# Patient Record
Sex: FEMALE | Race: WHITE
[De-identification: names, ages, dates, MRNs, and addresses within clinical notes are randomized per-mention and may not be internally consistent; named-entity substitution may affect disease eponyms.]

---

## 2019-08-19 ENCOUNTER — HOSPITAL ENCOUNTER (EMERGENCY)
Dept: HOSPITAL 25 - UCCORT | Age: 59
Discharge: HOME | End: 2019-08-19
Payer: COMMERCIAL

## 2019-08-19 VITALS — DIASTOLIC BLOOD PRESSURE: 68 MMHG | SYSTOLIC BLOOD PRESSURE: 134 MMHG

## 2019-08-19 DIAGNOSIS — F17.200: ICD-10-CM

## 2019-08-19 DIAGNOSIS — J44.1: Primary | ICD-10-CM

## 2019-08-19 DIAGNOSIS — E03.9: ICD-10-CM

## 2019-08-19 PROCEDURE — G0463 HOSPITAL OUTPT CLINIC VISIT: HCPCS

## 2019-08-19 PROCEDURE — 71046 X-RAY EXAM CHEST 2 VIEWS: CPT

## 2019-08-19 PROCEDURE — 99213 OFFICE O/P EST LOW 20 MIN: CPT

## 2019-08-19 NOTE — UC
General HPI





- HPI Summary


HPI Summary: 


59-year-old woman comes in with a chief complaint of shortness of breath.  She 

has COPD.  She's had upper respiratory tract infection symptoms for at least a 

week.  Been having green sputum.  She has oxygen that she uses when necessary 

at home.  Last 4 days she's been wearing continuously.  Breathing treatments 

help some but overall she's getting worse.  Decreased ability to ambulate 

secondary to shortness of breath.  Her chest feels tight from shortness of 

breath she states but denies any chest pain otherwise.  No pedal edema no calf 

pain.





- History of Current Complaint


Chief Complaint: UCGeneralIllness


Stated Complaint: DIFFICULTY BREATHING


Time Seen by Provider: 19 08:50


Pain Intensity: 0





- Allergy/Home Medications


Allergies/Adverse Reactions: 


 Allergies











Allergy/AdvReac Type Severity Reaction Status Date / Time


 


No Known Allergies Allergy   Verified 19 08:46











Home Medications: 


 Home Medications





Albuterol 2.5MG/3ML (0.083%)* [Ventolin 2.5 MG/3 ML NEB.SOL*] 2.5 mg INH Q4H  [History Confirmed 19]


Albuterol HFA INHALER* [Ventolin HFA Inhaler*] 2 puff INH Q4H PRN 19 [

History Confirmed 19]


FLUoxetine CAP* [PROzac CAP*] 10 mg PO DAILY 19 [History Confirmed ]


Levothyroxine Sodium 125 mcg PO DAILY 19 [History Confirmed 19]


Zolpidem Tartrate [Ambien] 5 mg PO DAILY 19 [History Confirmed 19]











PMH/Surg Hx/FS Hx/Imm Hx


Previously Healthy: Yes


Endocrine History: Hypothyroidism


Respiratory History: COPD





- Surgical History


Surgical History: Yes


Surgery Procedure, Year, and Place: hysterectomy





- Family History


Known Family History: Positive: Non-Contributory





- Social History


Alcohol Use: Occasionally


Substance Use Type: None


Smoking Status (MU): Light Every Day Tobacco Smoker


Length of Time of Smoking/Using Tobacco: 40y





Review of Systems


All Other Systems Reviewed And Are Negative: Yes


Constitutional: Positive: Other - SEE HPI


Skin: Positive: Negative


Eyes: Positive: Negative


ENT: Positive: Nasal Discharge


Respiratory: Positive: Shortness Of Breath, Cough, Other - SEE HPI


Cardiovascular: Positive: Other - SEE HPI


Gastrointestinal: Positive: Negative


Motor: Positive: Negative


Neurovascular: Positive: Negative


Musculoskeletal: Positive: Negative.  Negative: Calf Tenderness, Edema


Neurological: Positive: Negative


Psychological: Positive: Negative


Is Patient Immunocompromised?: No





Physical Exam


Triage Information Reviewed: Yes


Appearance: Well-Appearing, No Pain Distress, Well-Nourished


Vital Signs: 


 Initial Vital Signs











Temp  98.3 F   19 08:40


 


Pulse  78   19 08:40


 


Resp  24   19 08:40


 


BP  134/68   19 08:40


 


Pulse Ox  94   19 08:40











Vital Signs Reviewed: Yes


Eye Exam: Normal


Eyes: Positive: Conjunctiva Clear


ENT: Positive: Nasal congestion


Neck: Positive: Supple


Respiratory: Positive: Respiratory distress - MILD, Wheezing


Cardiovascular: Positive: RRR


Musculoskeletal: Positive: Strength Intact, ROM Intact, No Edema - NO CALF 

TENDERNESS


Neurological: Positive: Alert


Psychological: Positive: Age Appropriate Behavior


Skin Exam: Normal





Course/Dx





- Course


Course Of Treatment: 





Patient Name: SANTOS LUU Medical Record#: [id number]


Ordering Physician: Matthew Delacruz MD Acct.#: [id number]


: 1960 Age: 59 Sex: F Location: URGENT CARE [facility]


Exam Date: 19 ADM Status: REG ER





Order Information: CHEST PA LAT 2 VWS


Accession Number: [id number]


CPT: [id number]


INDICATION: Shortness of breath





COMPARISON: There are no relevant prior studies available for comparison.





TECHNIQUE: Dual-energy PA and lateral views of the chest were obtained.





FINDINGS:





The lungs are clear. There is no pleural effusion.





The cardiomediastinal silhouette is within normal limits.





The upper abdominal contents are normal.





Osseous structures are unremarkable.





IMPRESSION:





1. No acute cardiopulmonary process by radiograph.











____________________________________________________________


<Electronically signed by Dylan Stokes MD in OV> 19 [id number]








I discussed the x-rays with the patient.  I discussed going to the emergency 

department for further evaluation and care of the patient declined going to 

emergency department.  Plan is to treat with Levaquin 750 mg by mouth daily for 

7 days.  Also treated with prednisone.  She will use her nebulizers.  Follow-up 

primary care doctor go the emergency department if not improved worse.





- Diagnoses


Provider Diagnosis: 


 COPD exacerbation, Bronchitis








Discharge





- Sign-Out/Discharge


Documenting (check all that apply): Patient Departure


All imaging exams completed and their final reports reviewed: Yes





- Discharge Plan


Condition: Stable


Disposition: HOME


Prescriptions: 


Levofloxacin TAB* [Levaquin TAB*] 750 mg PO DAILY #7 tab


predniSONE TAB* [Deltasone 20 MG TAB*] 40 mg PO DAILY #10 tab


Patient Education Materials:  COPD (Chronic Obstructive Pulmonary Disease) (ED)

, Acute Bronchitis (ED)


Referrals: 


LOVE Soler [Primary Care Provider] - 


Additional Instructions: 


FOLLOW UP WITH YOUR DOCTOR IF NOT COMPLETELY IMPROVED.


GO TO THE EMERGENCY DEPARTMENT IF NOT IMPROVED OR WORSE; SHORTNESS OF BREATH, 

CHEST PAIN, YOU FEEL ILL OR ANY QUESTIONS OR CONCERNS.








- Billing Disposition and Condition


Condition: STABLE


Disposition: Home

## 2021-04-24 ENCOUNTER — HOSPITAL ENCOUNTER (EMERGENCY)
Dept: HOSPITAL 11 - JP.ED | Age: 61
LOS: 1 days | Discharge: HOME | End: 2021-04-25
Payer: MEDICAID

## 2021-04-24 DIAGNOSIS — J47.1: Primary | ICD-10-CM

## 2021-04-24 PROCEDURE — 36415 COLL VENOUS BLD VENIPUNCTURE: CPT

## 2021-04-24 PROCEDURE — 94640 AIRWAY INHALATION TREATMENT: CPT

## 2021-04-24 PROCEDURE — 85025 COMPLETE CBC W/AUTO DIFF WBC: CPT

## 2021-04-24 PROCEDURE — 80053 COMPREHEN METABOLIC PANEL: CPT

## 2021-04-24 PROCEDURE — 99285 EMERGENCY DEPT VISIT HI MDM: CPT

## 2021-04-24 PROCEDURE — 71046 X-RAY EXAM CHEST 2 VIEWS: CPT

## 2021-04-24 PROCEDURE — 96372 THER/PROPH/DIAG INJ SC/IM: CPT

## 2021-04-24 NOTE — EDM.PDOC
ED HPI GENERAL MEDICAL PROBLEM





- General


Chief Complaint: Respiratory Problem


Stated Complaint: TROUBLE WITH COPD


Time Seen by Provider: 04/24/21 21:57


Source of Information: Reports: Patient


History Limitations: Reports: No Limitations





- History of Present Illness


INITIAL COMMENTS - FREE TEXT/NARRATIVE: 


Kristi is a 60-year-old female with complaint of increased dyspnea.  She has a 

history for COPD but continues to smoke.  She is visiting from [place] for her 

mother's [place].  She states that she has had increasing dyspnea over the last

2 days and has been trying to use her medications without any improvement.  

Prompted her to come to the ER tonight.  Patient denies any fever.  She has a 

cough but has not been productive.  She has increased dyspnea and tented 

respirations.  Despite her advanced COPD she still smokes 1 pack/day.





  ** Thoracic


Pain Score (Numeric/FACES): 6





- Related Data


                                    Allergies











Allergy/AdvReac Type Severity Reaction Status Date / Time


 


No Known Allergies Allergy   Verified 04/24/21 21:43











Home Meds: 


                                    Home Meds





Albuterol Sulfate [Albuterol Sulfate Hfa] 2 puff INH Q4HR PRN 04/24/21 [History]


Benzonatate [Tessalon Perle] 100 mg PO TID PRN #20 capsule 04/24/21 [Rx]


Budesonide/Formoterol [Symbicort 160-4.5 MCG] 2 puff INH BID 04/24/21 [History]


FLUoxetine HCl [Fluoxetine HCl] 2 cap PO DAILY 04/24/21 [History]


Fluticasone Propionate [Flonase] 2 inh NASLF DAILY 04/24/21 [History]


Fluticasone/Salmeterol [Advair 500-50] 2 inh INH DAILY 04/24/21 [History]


Levothyroxine 1 tab PO DAILY 04/24/21 [History]


Zolpidem Tartrate [Ambien] 1 tab PO BEDTIME 04/24/21 [History]











ED ROS GENERAL





- Review of Systems


Review Of Systems: See Below


Constitutional: Reports: No Symptoms


HEENT: Reports: No Symptoms


Respiratory: Reports: Shortness of Breath, Wheezing, Cough.  Denies: Sputum


Cardiovascular: Reports: No Symptoms


Endocrine: Reports: No Symptoms


GI/Abdominal: Reports: No Symptoms


: Reports: No Symptoms


Musculoskeletal: Reports: No Symptoms


Skin: Reports: No Symptoms


Neurological: Reports: No Symptoms


Psychiatric: Reports: Anxiety


Hematologic/Lymphatic: Reports: No Symptoms


Immunologic: Reports: No Symptoms





ED EXAM, GENERAL





- Physical Exam


Exam: See Below


Exam Limited By: No Limitations


General Appearance: Alert, Moderate Distress


Eye Exam: Bilateral Eye: EOMI, PERRL


Throat/Mouth: Normal Inspection, Normal Oropharynx, Normal Voice, No Airway 

Compromise


Head: Atraumatic, Normocephalic


Neck: Normal Inspection, Supple


Respiratory/Chest: Respiratory Distress (Tachypnea with tented respirations), 

Decreased Breath Sounds (Diminished breath sounds especially in the bases), 

Wheezing (Diffuse bilateral expiratory greater than inspiratory wheezes), 

Accessory Muscle Use, Prolonged Expiration


Cardiovascular: Normal Peripheral Pulses, Regular Rate, Rhythm, No Murmur


Peripheral Pulses: 2+: Radial (L), Radial (R), Posterior Tibial (L), Posterior 

Tibial (R)


GI/Abdominal: Normal Bowel Sounds, Soft, Non-Tender


Back Exam: Normal Inspection


Extremities: Normal Inspection, No Pedal Edema


Neurological: Alert, Oriented, Normal Cognition, No Motor/Sensory Deficits


Psychiatric: Normal Affect, Anxious


Skin Exam: Warm, Dry


Lymphatic: No Adenopathy





Course





- Vital Signs


Last Recorded V/S: 


                                Last Vital Signs











Temp  36.8 C   04/24/21 22:00


 


Pulse  102 H  04/24/21 22:00


 


Resp  18   04/24/21 22:00


 


BP  164/82 H  04/24/21 22:00


 


Pulse Ox  95   04/24/21 22:00














- Orders/Labs/Meds


Orders: 


                               Active Orders 24 hr











 Category Date Time Status


 


 RT Aerosol Therapy [RC] ASDIRECTED Care  04/24/21 21:58 Active


 


 RT Aerosol Therapy [RC] ASDIRECTED Care  04/24/21 23:23 Active


 


 Chest 2V [CR] Stat Exams  04/24/21 21:57 Taken











Labs: 


                                Laboratory Tests











  04/24/21 04/24/21 Range/Units





  22:12 22:12 


 


WBC  10.0   (4.5-11.0)  K/uL


 


RBC  4.05   (3.30-5.50)  M/uL


 


Hgb  12.4   (12.0-15.0)  g/dL


 


Hct  38.3   (36.0-48.0)  %


 


MCV  95   (80-98)  fL


 


MCH  31   (27-31)  pg


 


MCHC  32   (32-36)  %


 


Plt Count  391   (150-400)  K/uL


 


Neut % (Auto)  58   (36-66)  %


 


Lymph % (Auto)  28   (24-44)  %


 


Mono % (Auto)  10 H   (2-6)  %


 


Eos % (Auto)  4   (2-4)  %


 


Baso % (Auto)  0   (0-1)  %


 


Sodium   148  (140-148)  mmol/L


 


Potassium   4.0  (3.6-5.2)  mmol/L


 


Chloride   108  (100-108)  mmol/L


 


Carbon Dioxide   25  (21-32)  mmol/L


 


Anion Gap   15.5 H  (5.0-14.0)  mmol/L


 


BUN   10  (7-18)  mg/dL


 


Creatinine   1.0  (0.6-1.0)  mg/dL


 


Est Cr Clr Drug Dosing   TNP  


 


Estimated GFR (MDRD)   57 L  (>60)  


 


Glucose   98  ()  mg/dL


 


Calcium   10.1  (8.5-10.1)  mg/dL


 


Total Bilirubin   0.2  (0.2-1.0)  mg/dL


 


AST   38 H  (15-37)  U/L


 


ALT   48  (12-78)  U/L


 


Alkaline Phosphatase   95  ()  U/L


 


Total Protein   7.0  (6.4-8.2)  g/dL


 


Albumin   3.5  (3.4-5.0)  g/dL


 


Globulin   3.5  (2.3-3.5)  g/dL


 


Albumin/Globulin Ratio   1.0 L  (1.2-2.2)  











Meds: 


Medications














Discontinued Medications














Generic Name Dose Route Start Last Admin





  Trade Name Freq  PRN Reason Stop Dose Admin


 


Albuterol/Ipratropium  3 ml  04/24/21 21:57  04/24/21 22:11





  Albuterol/Ipratropium 3.0-0.5 Mg/3 Ml Neb Soln  NEB  04/24/21 21:58  3 ml





  ONETIME ONE   Administration


 


Albuterol/Ipratropium  3 ml  04/24/21 23:23  04/24/21 23:30





  Albuterol/Ipratropium 3.0-0.5 Mg/3 Ml Neb Soln  NEB  04/24/21 23:24  3 ml





  ONETIME ONE   Administration


 


Benzonatate  200 mg  04/24/21 23:18  04/24/21 23:31





  Benzonatate 100 Mg Cap  PO  04/24/21 23:19  200 mg





  ONETIME ONE   Administration


 


Methylprednisolone Sodium Succinate  125 mg  04/24/21 21:57  04/24/21 22:11





  Methylprednisolone Sodium Succinate 125 Mg/2 Ml Sdv  IM  04/24/21 21:58  125 

mg





  ONETIME ONE   Administration














- Radiology Interpretation


Free Text/Narrative:: 


I reviewed the two-view chest x-ray showing hyperinflation and a left pleural 

effusion.  There is no acute infiltrates.








- Re-Assessments/Exams


Free Text/Narrative Re-Assessment/Exam: 





04/24/21 23:36 I reviewed the patient's x-rays of the chest as well as her labs.

  The chest x-ray shows hyperinflation without obvious infiltrates and a left 

pleural effusion.  The labs show a normal CBC and comprehensive metabolic panel.

  This is likely an acute on chronic flare of her COPD.  My plan is to treat her

 with azithromycin for bronchiectasis and a prednisone 10-day taper for her COPD

 flare.  We will also give her a prescription for Tessalon Perles that she can 

fill in the morning for her cough.








Departure





- Departure


Time of Disposition: 23:57


Disposition: Home, Self-Care 01


Clinical Impression: 


 COPD exacerbation





Bronchiectasis


Qualifiers:


 Bronchiectasis type: with acute exacerbation Qualified Code(s): J47.1 - Br

onchiectasis with (acute) exacerbation








- Discharge Information


Prescriptions: 


Benzonatate [Tessalon Perle] 100 mg PO TID PRN #20 capsule


 PRN Reason: Cough


Instructions:  COPD and Physical Activity, Chronic Obstructive Pulmonary Disease

Exacerbation, Easy-to-Read


Referrals: 


PCP,None [Primary Care Provider] - 


Forms:  ED Department Discharge


Care Plan Goals: 


I have prescribed azithromycin as an antibiotic to help with your bronchiectasis

(chronic bronchitis) as well as a prednisone taper for your COPD exacerbation 

and a written prescription for you to fill in the morning for Tessalon Perles 

for the cough.





Sepsis Event Note (ED)





- Focused Exam


Vital Signs: 


                                   Vital Signs











  Temp Pulse Resp BP Pulse Ox


 


 04/24/21 22:00  36.8 C  102 H  18  164/82 H  95














- Problem List & Annotations


(1) Bronchiectasis


SNOMED Code(s): 49809885


   Code(s): J47.9 - BRONCHIECTASIS, UNCOMPLICATED   Status: Acute   Priority: 

High   Current Visit: Yes   


Qualifiers: 


   Bronchiectasis type: with acute exacerbation   Qualified Code(s): J47.1 - 

Bronchiectasis with (acute) exacerbation   





(2) COPD exacerbation


SNOMED Code(s): 314385885


   Code(s): J44.1 - CHRONIC OBSTRUCTIVE PULMONARY DISEASE W (ACUTE) EXACERBATION

  Status: Acute   Priority: High   Current Visit: Yes   





- Problem List Review


Problem List Initiated/Reviewed/Updated: Yes





- My Orders


Last 24 Hours: 


My Active Orders





04/24/21 21:57


Chest 2V [CR] Stat 





04/24/21 21:58


RT Aerosol Therapy [RC] ASDIRECTED 





04/24/21 23:23


RT Aerosol Therapy [RC] ASDIRECTED 














- Assessment/Plan


Last 24 Hours: 


My Active Orders





04/24/21 21:57


Chest 2V [CR] Stat 





04/24/21 21:58


RT Aerosol Therapy [RC] ASDIRECTED 





04/24/21 23:23


RT Aerosol Therapy [RC] ASDIRECTED

## 2021-04-26 NOTE — CR
CHEST: 2 view

 

CLINICAL HISTORY:Dyspnea

 

COMPARISON:None

 

FINDINGS:  The heart size, pulmonary vascularity and hilar structures are

normal. No infiltrate effusion or pneumothorax is seen.

 

IMPRESSION: No acute cardiopulmonary process.
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
## 2021-07-21 NOTE — CR
CHEST: 2 view

 

CLINICAL HISTORY:Dyspnea, cough, COPD

 

COMPARISON:4/24/2021

 

FINDINGS:  The heart size, pulmonary vascularity and hilar structures are

normal. No infiltrate effusion or pneumothorax is seen. There is some density in

the lingula. This has increased slightly since the prior study and has a more

convex margin. Some of this due to cardiac fat pad and pleural reflection.

Pleural based lesion is not excluded.

 

IMPRESSION: Increasing lingular density

 

Short-term follow-up recommended to exclude underlying lesion